# Patient Record
Sex: MALE | Race: WHITE
[De-identification: names, ages, dates, MRNs, and addresses within clinical notes are randomized per-mention and may not be internally consistent; named-entity substitution may affect disease eponyms.]

---

## 2019-01-01 ENCOUNTER — HOSPITAL ENCOUNTER (EMERGENCY)
Dept: HOSPITAL 42 - ED | Age: 64
Discharge: HOME | End: 2019-01-01
Payer: MEDICAID

## 2019-01-01 VITALS
HEART RATE: 85 BPM | RESPIRATION RATE: 19 BRPM | TEMPERATURE: 98 F | SYSTOLIC BLOOD PRESSURE: 118 MMHG | DIASTOLIC BLOOD PRESSURE: 53 MMHG

## 2019-01-01 VITALS — BODY MASS INDEX: 39.9 KG/M2

## 2019-01-01 VITALS — OXYGEN SATURATION: 99 %

## 2019-01-01 DIAGNOSIS — Z87.891: ICD-10-CM

## 2019-01-01 DIAGNOSIS — E11.9: ICD-10-CM

## 2019-01-01 DIAGNOSIS — L50.9: Primary | ICD-10-CM

## 2019-01-01 DIAGNOSIS — I10: ICD-10-CM

## 2019-01-01 PROCEDURE — 99284 EMERGENCY DEPT VISIT MOD MDM: CPT

## 2019-01-01 PROCEDURE — 96375 TX/PRO/DX INJ NEW DRUG ADDON: CPT

## 2019-01-01 PROCEDURE — 96374 THER/PROPH/DIAG INJ IV PUSH: CPT

## 2019-01-01 NOTE — ED PDOC
Arrival/HPI





- General


Chief Complaint: Allergic Reaction


Time Seen by Provider: 01/01/19 15:13


Historian: Patient





- History of Present Illness


Narrative History of Present Illness (Text): 





01/01/19 15:15


64yo male with past medical history  of hypertension who present with complaint 

of pruritic hives to his arms and body since this morning. Denies previous 

history, shortness of breath, tongue swelling, drooling, any new inciting 

factors, chest pain, fever, neck pain, abdominal pain, any other complaint.





Past Medical History





- Provider Review


Nursing Documentation Reviewed: Yes





- Cardiac


Hx Cardiac Disorders: Yes (HYPOTENSION)


Hx Hypertension: Yes





- Pulmonary


Hx Respiratory Disorders: Yes (SMOKED CIGARETTES. QUIT)





- Neurological


Hx Neurological Disorder: Yes


Hx Dizziness: Yes (SYNCOPAL EPISODE)





- Endocrine/Metabolic


Hx Endocrine Disorders: Yes


Hx Diabetes Mellitus Type 2: Yes





- Integumentary


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 

EXTREMITY)





- Musculoskeletal/Rheumatological


Hx Musculoskeletal Disorders: Yes


Hx Falls: Yes





- Psychiatric


Hx Psychophysiologic Disorder:  (SMOKED CIGARETTES)


Hx Substance Use: No





Family/Social History





- Physician Review


Nursing Documentation Reviewed: Yes


Family/Social History: Unknown Family HX


Smoking Status: Former Smoker


Hx Alcohol Use: No


Hx Substance Use: No





Allergies/Home Meds


Allergies/Adverse Reactions: 


Allergies





No Known Allergies Allergy (Verified 01/01/19 15:14)


   








Home Medications: 


                                    Home Meds











 Medication  Instructions  Recorded  Confirmed


 


Aspirin [Adult Low Dose Aspirin EC] 81 mg PO DAILY 08/11/16 01/01/19


 


Colchicine [Colcrys] 0.6 mg PO DAILY 08/11/16 01/01/19


 


Lovastatin 40 mg PO DAILY 08/11/16 01/01/19


 


metFORMIN [glucOPHAGE] 750 mg PO BID 08/11/16 01/01/19


 


Allopurinol [Zyloprim] 100 mg PO DAILY 01/01/19 01/01/19


 


Lisinopril/Hydrochlorothiazide 1 tab PO DAILY 01/01/19 01/01/19





[Lisinopril-Hctz 10-12.5 mg Tab]   














Review of Systems





- Physician Review


All systems were reviewed & negative as marked: Yes





- Review of Systems


Constitutional: Normal


Eyes: Normal


ENT: Normal


Respiratory: Normal


Cardiovascular: Normal


Gastrointestinal: Normal


Genitourinary Male: Normal


Musculoskeletal: Normal


Skin: Rash, Pruritis


Neurological: Normal


Endocrine: Normal


Hemo/Lymphatic: Normal


Psychiatric: Normal





Physical Exam


Vital Signs Reviewed: Yes





Vital Signs











  Temp Pulse Resp BP Pulse Ox


 


 01/01/19 15:05  97.6 F  84  18  116/80  98











Temperature: Afebrile


Blood Pressure: Normal


Pulse: Regular


Respiratory Rate: Normal


Appearance: Positive for: Well-Appearing, Non-Toxic, Comfortable


Pain Distress: None


Mental Status: Positive for: Alert and Oriented X 3





- Systems Exam


Head: Present: Atraumatic, Normocephalic


Pupils: Present: PERRL


Extroacular Muscles: Present: EOMI


Conjunctiva: Present: Normal


Mouth: Present: Moist Mucous Membranes.  No: Drooling


Pharnyx: No: Strider


Neck: Present: Normal Range of Motion


Respiratory/Chest: Present: Clear to Auscultation, Good Air Exchange.  No: 

Respiratory Distress, Accessory Muscle Use


Cardiovascular: Present: Regular Rate and Rhythm, Normal S1, S2.  No: Murmurs


Abdomen: No: Tenderness, Distention, Peritoneal Signs


Back: Present: Normal Inspection


Upper Extremity: Present: Normal Inspection.  No: Cyanosis, Edema


Lower Extremity: Present: Normal Inspection.  No: Edema


Neurological: Present: GCS=15, CN II-XII Intact, Speech Normal


Skin: Present: Warm, Dry, Rashes (Hives noted to right arm and trunk), Normal 

Color


Psychiatric: Present: Alert, Oriented x 3, Normal Insight, Normal Concentration





Medical Decision Making


ED Course and Treatment: 





01/01/19 17:04


64yo male present with complaint of pruritic rash since this morning.





Pt was comfortable in Emergency department. no drooling. No stridor noted.





Benadryl, Solu medrol and pepcid was given in Emergency department





He was observed in Emergency department and on re evaluation his rash improved 

significantly. he was DC and advised to f/u with a Dermatologist/Allergist. Also

strongly advised to check his BS constantly secondary to the steroid rx and f/u 

with his pmd.





Disposition/Present on Arrival





- Present on Arrival


Any Indicators Present on Arrival: No


History of DVT/PE: No


History of Uncontrolled Diabetes: No


Urinary Catheter: No


History of Decub. Ulcer: No


History Surgical Site Infection Following: None





- Disposition


Have Diagnosis and Disposition been Completed?: Yes


Diagnosis: 


 Hives





Disposition: HOME/ ROUTINE


Disposition Time: 17:00


Patient Plan: Discharge


Condition: STABLE


Discharge Instructions (ExitCare):  Hives


Additional Instructions: 


Follow up with a Dermatologist/Allergist


Return to Emergency department for any new or worsening symptoms


Prescriptions: 


DiphenhydrAMINE [Benadryl] 25 mg PO Q6 #20 cap


Famotidine [Pepcid] 20 mg PO DAILY #7 tab


predniSONE [predniSONE Tab] 20 mg PO BID #6 tab


Referrals: 


Karen Tracey MD [Staff Provider] - Follow up with primary


Forms:  [employer] (English)